# Patient Record
Sex: MALE | Race: BLACK OR AFRICAN AMERICAN | NOT HISPANIC OR LATINO | Employment: UNEMPLOYED | URBAN - METROPOLITAN AREA
[De-identification: names, ages, dates, MRNs, and addresses within clinical notes are randomized per-mention and may not be internally consistent; named-entity substitution may affect disease eponyms.]

---

## 2019-10-10 ENCOUNTER — HOSPITAL ENCOUNTER (OUTPATIENT)
Facility: HOSPITAL | Age: 49
Setting detail: OBSERVATION
Discharge: HOME | End: 2019-10-11
Attending: INTERNAL MEDICINE | Admitting: INTERNAL MEDICINE

## 2019-10-10 ENCOUNTER — APPOINTMENT (OUTPATIENT)
Dept: GENERAL RADIOLOGY | Facility: HOSPITAL | Age: 49
End: 2019-10-10

## 2019-10-10 DIAGNOSIS — R07.9 CHEST PAIN, UNSPECIFIED TYPE: Primary | ICD-10-CM

## 2019-10-10 DIAGNOSIS — R07.89 OTHER CHEST PAIN: ICD-10-CM

## 2019-10-10 LAB
ALBUMIN SERPL-MCNC: 3.7 G/DL (ref 3.5–4.8)
ALBUMIN/GLOB SERPL: 1.1 G/DL (ref 1–1.7)
ALP SERPL-CCNC: 87 U/L (ref 32–91)
ALT SERPL W P-5'-P-CCNC: 16 U/L (ref 17–63)
ANION GAP SERPL CALCULATED.3IONS-SCNC: 13.7 MMOL/L (ref 5–15)
AST SERPL-CCNC: 25 U/L (ref 15–41)
BASOPHILS # BLD AUTO: 0.1 10*3/MM3 (ref 0–0.2)
BASOPHILS NFR BLD AUTO: 1 % (ref 0–1.5)
BILIRUB SERPL-MCNC: 0.3 MG/DL (ref 0.3–1.2)
BNP SERPL-MCNC: 26 PG/ML
BUN BLD-MCNC: 10 MG/DL (ref 8–20)
BUN/CREAT SERPL: 9.1 (ref 6.2–20.3)
CALCIUM SPEC-SCNC: 9.2 MG/DL (ref 8.9–10.3)
CHLORIDE SERPL-SCNC: 100 MMOL/L (ref 101–111)
CO2 SERPL-SCNC: 28 MMOL/L (ref 22–32)
CREAT BLD-MCNC: 1.1 MG/DL (ref 0.7–1.2)
D DIMER PPP FEU-MCNC: 0.29 MCGFEU/ML (ref 0.17–0.59)
DEPRECATED RDW RBC AUTO: 49 FL (ref 37–54)
EOSINOPHIL # BLD AUTO: 0 10*3/MM3 (ref 0–0.4)
EOSINOPHIL NFR BLD AUTO: 0.4 % (ref 0.3–6.2)
ERYTHROCYTE [DISTWIDTH] IN BLOOD BY AUTOMATED COUNT: 15.6 % (ref 12.3–15.4)
GFR SERPL CREATININE-BSD FRML MDRD: 87 ML/MIN/1.73
GLOBULIN UR ELPH-MCNC: 3.5 GM/DL (ref 2.5–3.8)
GLUCOSE BLD-MCNC: 83 MG/DL (ref 65–99)
HCT VFR BLD AUTO: 40.9 % (ref 37.5–51)
HGB BLD-MCNC: 13.9 G/DL (ref 13–17.7)
LYMPHOCYTES # BLD AUTO: 2.5 10*3/MM3 (ref 0.7–3.1)
LYMPHOCYTES NFR BLD AUTO: 31.2 % (ref 19.6–45.3)
MCH RBC QN AUTO: 30.3 PG (ref 26.6–33)
MCHC RBC AUTO-ENTMCNC: 33.9 G/DL (ref 31.5–35.7)
MCV RBC AUTO: 89.4 FL (ref 79–97)
MONOCYTES # BLD AUTO: 0.5 10*3/MM3 (ref 0.1–0.9)
MONOCYTES NFR BLD AUTO: 6.8 % (ref 5–12)
NEUTROPHILS # BLD AUTO: 4.9 10*3/MM3 (ref 1.7–7)
NEUTROPHILS NFR BLD AUTO: 60.6 % (ref 42.7–76)
NRBC BLD AUTO-RTO: 0.1 /100 WBC (ref 0–0.2)
PLATELET # BLD AUTO: 380 10*3/MM3 (ref 140–450)
PMV BLD AUTO: 7.2 FL (ref 6–12)
POTASSIUM BLD-SCNC: 3.7 MMOL/L (ref 3.6–5.1)
PROT SERPL-MCNC: 7.2 G/DL (ref 6.1–7.9)
RBC # BLD AUTO: 4.58 10*6/MM3 (ref 4.14–5.8)
SODIUM BLD-SCNC: 138 MMOL/L (ref 136–144)
TROPONIN I SERPL-MCNC: <0.03 NG/ML (ref 0–0.03)
WBC NRBC COR # BLD: 8.1 10*3/MM3 (ref 3.4–10.8)

## 2019-10-10 PROCEDURE — 93005 ELECTROCARDIOGRAM TRACING: CPT

## 2019-10-10 PROCEDURE — 83880 ASSAY OF NATRIURETIC PEPTIDE: CPT | Performed by: PHYSICIAN ASSISTANT

## 2019-10-10 PROCEDURE — 99220 PR INITIAL OBSERVATION CARE/DAY 70 MINUTES: CPT | Performed by: INTERNAL MEDICINE

## 2019-10-10 PROCEDURE — 71045 X-RAY EXAM CHEST 1 VIEW: CPT

## 2019-10-10 PROCEDURE — 80053 COMPREHEN METABOLIC PANEL: CPT | Performed by: PHYSICIAN ASSISTANT

## 2019-10-10 PROCEDURE — 85025 COMPLETE CBC W/AUTO DIFF WBC: CPT | Performed by: PHYSICIAN ASSISTANT

## 2019-10-10 PROCEDURE — 99284 EMERGENCY DEPT VISIT MOD MDM: CPT

## 2019-10-10 PROCEDURE — 93005 ELECTROCARDIOGRAM TRACING: CPT | Performed by: INTERNAL MEDICINE

## 2019-10-10 PROCEDURE — 84484 ASSAY OF TROPONIN QUANT: CPT | Performed by: PHYSICIAN ASSISTANT

## 2019-10-10 PROCEDURE — 85379 FIBRIN DEGRADATION QUANT: CPT | Performed by: PHYSICIAN ASSISTANT

## 2019-10-10 PROCEDURE — G0378 HOSPITAL OBSERVATION PER HR: HCPCS

## 2019-10-10 PROCEDURE — 84484 ASSAY OF TROPONIN QUANT: CPT | Performed by: INTERNAL MEDICINE

## 2019-10-10 RX ORDER — SODIUM CHLORIDE 0.9 % (FLUSH) 0.9 %
10 SYRINGE (ML) INJECTION EVERY 12 HOURS SCHEDULED
Status: DISCONTINUED | OUTPATIENT
Start: 2019-10-10 | End: 2019-10-11 | Stop reason: HOSPADM

## 2019-10-10 RX ORDER — ONDANSETRON 2 MG/ML
4 INJECTION INTRAMUSCULAR; INTRAVENOUS EVERY 6 HOURS PRN
Status: DISCONTINUED | OUTPATIENT
Start: 2019-10-10 | End: 2019-10-11 | Stop reason: HOSPADM

## 2019-10-10 RX ORDER — ASPIRIN 81 MG/1
81 TABLET, CHEWABLE ORAL ONCE
Status: DISCONTINUED | OUTPATIENT
Start: 2019-10-10 | End: 2019-10-11 | Stop reason: HOSPADM

## 2019-10-10 RX ORDER — ACETAMINOPHEN 650 MG/1
650 SUPPOSITORY RECTAL EVERY 4 HOURS PRN
Status: DISCONTINUED | OUTPATIENT
Start: 2019-10-10 | End: 2019-10-11 | Stop reason: HOSPADM

## 2019-10-10 RX ORDER — CHOLECALCIFEROL (VITAMIN D3) 125 MCG
5 CAPSULE ORAL NIGHTLY PRN
Status: DISCONTINUED | OUTPATIENT
Start: 2019-10-10 | End: 2019-10-11 | Stop reason: HOSPADM

## 2019-10-10 RX ORDER — SODIUM CHLORIDE 0.9 % (FLUSH) 0.9 %
10 SYRINGE (ML) INJECTION AS NEEDED
Status: DISCONTINUED | OUTPATIENT
Start: 2019-10-10 | End: 2019-10-11 | Stop reason: HOSPADM

## 2019-10-10 RX ORDER — ACETAMINOPHEN 325 MG/1
650 TABLET ORAL EVERY 4 HOURS PRN
Status: DISCONTINUED | OUTPATIENT
Start: 2019-10-10 | End: 2019-10-11 | Stop reason: HOSPADM

## 2019-10-10 RX ORDER — ACETAMINOPHEN 160 MG/5ML
650 SOLUTION ORAL EVERY 4 HOURS PRN
Status: DISCONTINUED | OUTPATIENT
Start: 2019-10-10 | End: 2019-10-11 | Stop reason: HOSPADM

## 2019-10-10 RX ORDER — NITROGLYCERIN 0.4 MG/1
0.4 TABLET SUBLINGUAL
Status: DISCONTINUED | OUTPATIENT
Start: 2019-10-10 | End: 2019-10-11 | Stop reason: HOSPADM

## 2019-10-10 RX ORDER — ONDANSETRON 4 MG/1
4 TABLET, FILM COATED ORAL EVERY 6 HOURS PRN
Status: DISCONTINUED | OUTPATIENT
Start: 2019-10-10 | End: 2019-10-11 | Stop reason: HOSPADM

## 2019-10-10 RX ADMIN — Medication 10 ML: at 20:24

## 2019-10-10 NOTE — ED NOTES
Cp for last 2 days, st it was worse today. Went to [facility] [place] [facility] and was sent here     Hilda Franklin RN  10/10/19 1074

## 2019-10-10 NOTE — ED NOTES
Iv not flushing from urgent care. Removed and new iv placed     Hilda Franklin RN  10/10/19 2843

## 2019-10-10 NOTE — H&P
"[facility] HOSPITALIST     Provider, No Known    CHIEF COMPLAINT:     Chest pain    HISTORY OF PRESENT ILLNESS:    Patient is a 48 y/o male who presented to the ED complaining of a two day history of chest pain that initially started out as left sided chest pain radiating to his left arm and neck that was sharp and intermittent. This am around 0500 it woke him from sleep and was constant and more severe. He went to urgent care who sent him to the ED. Patient states he had some improvement with aspirin and nitro given in the ambulance. He denies any other exacerbating or relieving factors. Had some associated sob and diaphoresis.       History reviewed. No pertinent past medical history.  History reviewed. No pertinent surgical history.  Family History   Problem Relation Age of Onset   • Hypertension Mother    • Diabetes Father      Social History     Tobacco Use   • Smoking status: Current Every Day Smoker     Packs/day: 0.50     Types: Cigarettes   • Smokeless tobacco: Never Used   Substance Use Topics   • Alcohol use: Yes     Comment: occ   • Drug use: No       (Not in a hospital admission)  Allergies:  Patient has no known allergies.      There is no immunization history on file for this patient.        REVIEW OF SYSTEMS:  Please see the above history of present illness for pertinent positives and negatives.  The remainder of the patient's systems have been reviewed and are negative.     Vital Signs  Temp:  [98.3 °F (36.8 °C)] 98.3 °F (36.8 °C)  Heart Rate:  [81] 81  Resp:  [15-16] 16  BP: (113-114)/(70-74) 113/70    Flowsheet Rows      First Filed Value   Admission Height  180.3 cm (71\") Documented at 10/10/2019 1140   Admission Weight  66.7 kg (147 lb) Documented at 10/10/2019 1140           Physical Exam:    General: NAD, resting in bed  HENT: normocephalic, atraumatic, MMM, pharynx clear without erythema or exudate  Cardiology: S1, S2, no murmurs  Resp: CTA b/l, no r/r/w  GI: soft, nt, nd, " +bs  Skin: warm, dry, intact, no rashes  Extremities: no c/c/e  Neuro: CN II-XII grossly intact, no focal deficits  Psych: appropriate mood and affect        Results Review:    I reviewed the patient's new clinical results.  Lab Results (most recent)     Procedure Component Value Units Date/Time    Troponin [[id number]]  (Normal) Collected:  10/10/19 1432    Specimen:  Blood Updated:  10/10/19 1517     Troponin I <0.030 ng/mL     Narrative:       Troponin I Reference Range:    0.00-0.03  Negative.  Repeat testing in 4-6 hours if clinically indicated.    0.04-0.29  Suspicious for myocardial injury. Serial measurements and clinical  correlation may be necessary to confirm or exclude diagnosis of acute  coronary syndrome.  Repeat testing in 4-6 hours if indicated.     >0.29 Consistent with myocardial injury.  Recommend clinical and laboratory correlation.     Results my be falsely decreased if patient taking Biotin.     BNP [[id number]]  (Normal) Collected:  10/10/19 1159    Specimen:  Blood Updated:  10/10/19 1512     BNP 26.0 pg/mL      Comment: Results may be falsely decreased if patient taking Biotin.       Troponin [[id number]]  (Normal) Collected:  10/10/19 1159    Specimen:  Blood Updated:  10/10/19 1231     Troponin I <0.030 ng/mL     Narrative:       Troponin I Reference Range:    0.00-0.03  Negative.  Repeat testing in 4-6 hours if clinically indicated.    0.04-0.29  Suspicious for myocardial injury. Serial measurements and clinical  correlation may be necessary to confirm or exclude diagnosis of acute  coronary syndrome.  Repeat testing in 4-6 hours if indicated.     >0.29 Consistent with myocardial injury.  Recommend clinical and laboratory correlation.     Results my be falsely decreased if patient taking Biotin.     Comprehensive Metabolic Panel [[id number]]  (Abnormal) Collected:  10/10/19 1159    Specimen:  Blood Updated:  10/10/19 1223     Glucose 83 mg/dL      BUN 10 mg/dL      Creatinine 1.10 mg/dL       Sodium 138 mmol/L      Potassium 3.7 mmol/L      Chloride 100 mmol/L      CO2 28.0 mmol/L      Calcium 9.2 mg/dL      Total Protein 7.2 g/dL      Albumin 3.70 g/dL      ALT (SGPT) 16 U/L      AST (SGOT) 25 U/L      Alkaline Phosphatase 87 U/L      Total Bilirubin 0.3 mg/dL      eGFR  Edgardo Amer 87 mL/min/1.73      Globulin 3.5 gm/dL      A/G Ratio 1.1 g/dL      BUN/Creatinine Ratio 9.1     Anion Gap 13.7 mmol/L     D-dimer, Quantitative [[id number]]  (Normal) Collected:  10/10/19 1159    Specimen:  Blood Updated:  10/10/19 1219     D-Dimer, Quantitative 0.29 MCGFEU/mL     Narrative:       Reference Range  --------------------------------------------------------------------     < 0.50   Negative Predictive Value  0.50-0.59   Indeterminate    >= 0.60   Probable VTE             A very low percentage of patients with DVT may yield D-Dimer results   below the cut-off of 0.50 MCGFEU/mL.  This is known to be more   prevalent in patients with distal DVT.             Results of this test should always be interpreted in conjunction with   the patient's medical history, clinical presentation and other   findings.  Clinical diagnosis should not be based on the result of   INNOVANCE D-Dimer alone.    CBC & Differential [[id number]] Collected:  10/10/19 1159    Specimen:  Blood Updated:  10/10/19 1207    Narrative:       The following orders were created for panel order CBC & Differential.  Procedure                               Abnormality         Status                     ---------                               -----------         ------                     CBC Auto Differential[[id number]]        Abnormal            Final result                 Please view results for these tests on the individual orders.    CBC Auto Differential [[id number]]  (Abnormal) Collected:  10/10/19 1159    Specimen:  Blood Updated:  10/10/19 1207     WBC 8.10 10*3/mm3      RBC 4.58 10*6/mm3      Hemoglobin 13.9 g/dL      Hematocrit 40.9 %      MCV  89.4 fL      MCH 30.3 pg      MCHC 33.9 g/dL      RDW 15.6 %      RDW-SD 49.0 fl      MPV 7.2 fL      Platelets 380 10*3/mm3      Neutrophil % 60.6 %      Lymphocyte % 31.2 %      Monocyte % 6.8 %      Eosinophil % 0.4 %      Basophil % 1.0 %      Neutrophils, Absolute 4.90 10*3/mm3      Lymphocytes, Absolute 2.50 10*3/mm3      Monocytes, Absolute 0.50 10*3/mm3      Eosinophils, Absolute 0.00 10*3/mm3      Basophils, Absolute 0.10 10*3/mm3      nRBC 0.1 /100 WBC           Imaging Results (most recent)     Procedure Component Value Units Date/Time    XR Chest 1 View [[id number]] Collected:  10/10/19 1226     Updated:  10/10/19 1230    Narrative:       DATE OF EXAM:  10/10/2019 12:15 PM     PROCEDURE:  XR CHEST 1 VW-     INDICATIONS:  Chest pain protocol.     COMPARISON:  10/07/2015, unavailable     TECHNIQUE:   Single radiographic AP view of the chest was obtained.     FINDINGS:  Emphysematous changes involve the lung apices. No pneumothorax is seen.  No acute infiltrate. No cardiomediastinal enlargement.        Impression:       No acute cardiopulmonary disease is seen.     Electronically Signed By-Dr. Trenton Suárez MD On:10/10/2019 12:28 PM  This report was finalized on [id number] by Dr. Trenton Suárez MD.        reviewed    ECG/EMG Results (most recent)     Procedure Component Value Units Date/Time    ECG 12 Lead [[id number]] Collected:  10/10/19 1141     Updated:  10/10/19 1144    Narrative:       HEART RATE= 87  bpm  RR Interval= 688  ms  MA Interval= 170  ms  P Horizontal Axis= 0  deg  P Front Axis= 70  deg  QRSD Interval= 90  ms  QT Interval= 362  ms  QRS Axis= 69  deg  T Wave Axis= 51  deg  - ABNORMAL ECG -  Sinus rhythm  Consider left ventricular hypertrophy  ST elevation suggests acute pericarditis  Electronically Signed By:   Date and Time of Study: 2019-10-10 11:41:33        Reviewed personally and appears unchanged from previous EKG.     Assessment/Plan     Chest Pain eval for ACS  - serial  troponin  - telemetry  - aspirin, nitro prn  - lipid panel in am  - stress test in am    DVT Prophylaxis  - none, pt is ambulatory     I discussed the patient's findings and my recommendations with patient.     Rukhsana Ackerman DO  10/10/19  3:51 PM

## 2019-10-10 NOTE — ED NOTES
Pt st four years ago he was dx with an enlarged heart valve.      Hilda Franklin RN  10/10/19 3429

## 2019-10-10 NOTE — ED PROVIDER NOTES
Subjective   History of Present Illness  Patient is a 49-year-old male who presents with left-sided chest pain for the past 2 days.  Patient states at times it does radiate into his left shoulder and jaw.  He describes as an intermittent type pain that is worse with bending better with rest.  Currently rates it a 7/10 severity.  Patient states he did go to urgent care prior to arrival to the ED he was then brought to the ED via EMS he was given nitro and aspirin prior to arrival.  He states his chest pain has improved since.  He states it first started when he was sleeping yesterday associated diaphoresis and shortness of breath.  Denies any nausea, vomiting, recent cough or illness, recent travel, fever states he has been worked up by a cardiologist several years ago he was told that he had an enlarged valve he states an echocardiogram was performed but states he has not followed up with anyone since.  Patient is a current every day smoker, occasional alcohol drinker, occasional marijuana use otherwise no other illicit drug use  Review of Systems   Constitutional: Negative.    Respiratory: Positive for chest tightness and shortness of breath. Negative for apnea, cough, choking, wheezing and stridor.    Cardiovascular: Positive for chest pain. Negative for palpitations and leg swelling.   Gastrointestinal: Negative for abdominal pain, nausea and vomiting.   Genitourinary: Negative for difficulty urinating, flank pain and hematuria.   Skin: Negative.    Neurological: Negative.        History reviewed. No pertinent past medical history.    No Known Allergies    History reviewed. No pertinent surgical history.    Family History   Problem Relation Age of Onset   • Hypertension Mother    • Diabetes Father        Social History     Socioeconomic History   • Marital status:      Spouse name: Not on file   • Number of children: Not on file   • Years of education: Not on file   • Highest education level: Not on file  "  Tobacco Use   • Smoking status: Current Every Day Smoker     Packs/day: 0.50     Types: Cigarettes   • Smokeless tobacco: Never Used   Substance and Sexual Activity   • Alcohol use: Yes     Comment: occ   • Drug use: No   • Sexual activity: Defer           Objective   Physical Exam   Nursing note and vitals reviewed.  The patient is well developed, well nourished, alert, cooperative and in no acute distress.    HEENT exam is normocephalic atraumatic. Pupils are equal round and reactive to light. EOMI. Conjunctiva noninjected, sclera anicteric, lids without ptosis, edema, or erythema.  Mucous membranes are moist.     Neck is supple nontender without lymphadenopathy.  No JVD bruit or stridor noted    Lungs are clear to auscultation bilaterally chest wall expansion equal without retraction    Cardiovascular:  Regular rate and rhythm without murmur there is no peripheral edema, cyanosis or pallor.  Extremities are warm and well perfused.  Capillary refill less than 2 second    Chest is nontender, no subcutaneous emphysema or crepitus felt     Back shows no CVA tenderness.  Neck and back show no spinal tenderness or bony step-off.  There is no asymmetry of spinal muscles , no tenderness, decreased range of motion or muscular spasm.     Neurologic:  Alert and oriented without focal neurological deficits.     Skin shows no rash, petechiae, or purpura.      Procedures           ED Course    /70 (BP Location: Right arm, Patient Position: Lying)   Pulse 81   Temp 98.3 °F (36.8 °C) (Oral)   Resp 16   Ht 180.3 cm (71\")   Wt 66.7 kg (147 lb)   SpO2 99%   BMI 20.50 kg/m²   Medications   sodium chloride 0.9 % flush 10 mL (not administered)     Xr Chest 1 View    Result Date: 10/10/2019  No acute cardiopulmonary disease is seen.  Electronically Signed By-Dr. Trenton Suárez MD On:10/10/2019 12:28 PM This report was finalized on [id number] by Dr. Trenton Suárez MD.    Labs Reviewed   COMPREHENSIVE METABOLIC PANEL - " Abnormal; Notable for the following components:       Result Value    Chloride 100 (*)     ALT (SGPT) 16 (*)     All other components within normal limits   CBC WITH AUTO DIFFERENTIAL - Abnormal; Notable for the following components:    RDW 15.6 (*)     All other components within normal limits   TROPONIN (IN-HOUSE) - Normal    Narrative:     Troponin I Reference Range:    0.00-0.03  Negative.  Repeat testing in 4-6 hours if clinically indicated.    0.04-0.29  Suspicious for myocardial injury. Serial measurements and clinical  correlation may be necessary to confirm or exclude diagnosis of acute  coronary syndrome.  Repeat testing in 4-6 hours if indicated.     >0.29 Consistent with myocardial injury.  Recommend clinical and laboratory correlation.     Results my be falsely decreased if patient taking Biotin.    D-DIMER, QUANTITATIVE - Normal    Narrative:     Reference Range  --------------------------------------------------------------------     < 0.50   Negative Predictive Value  0.50-0.59   Indeterminate    >= 0.60   Probable VTE             A very low percentage of patients with DVT may yield D-Dimer results   below the cut-off of 0.50 MCGFEU/mL.  This is known to be more   prevalent in patients with distal DVT.             Results of this test should always be interpreted in conjunction with   the patient's medical history, clinical presentation and other   findings.  Clinical diagnosis should not be based on the result of   INNOVANCE D-Dimer alone.   TROPONIN (IN-HOUSE)   BNP (IN-HOUSE)   CBC AND DIFFERENTIAL    Narrative:     The following orders were created for panel order CBC & Differential.  Procedure                               Abnormality         Status                     ---------                               -----------         ------                     CBC Auto Differential[[id number]]        Abnormal            Final result                 Please view results for these tests on the individual  orders.        EKG reviewed by myself and  sinus rhythm rate 87 with ST elevation suggestion of pericarditis this was compared to previous EKG from 10/8/2015 which showed no significant change            MDM  Number of Diagnoses or Management Options  Diagnosis management comments: Chart Review:  Comorbidity: Tobacco use, emphysema  ECG: EKG shows sinus rhythm rate 87 compared to previous EKG from 10/8/2015 with no significant change.  Labs: CBC CMP fairly unremarkable.  Troponin less than 0.03.  D-dimer 0.29, BNP pending upon admission.  Imaging: Was interpreted by physician and reviewed by myself:  Xr Chest 1 View  Result Date: 10/10/2019  No acute cardiopulmonary disease is seen.  Electronically Signed By-Dr. Trenton Suárez MD On:10/10/2019 12:28 PM This report was finalized on [id number] by Dr. Trenton Suárez MD.    Disposition/Treatment:  While in the ED IV was placed and labs were obtained.  Patient was given nitro sublingual and aspirin prior to arrival to the ED and states his chest pain had improved since.  Initial lab work was fairly benign finding was within normal limits.  D-dimer was also within normal limits.  BNP pending upon admission thoughts and findings were discussed with patient and family at bedside he voiced understanding admission for cardiac rule out.  Patient remained stable throughout ED stay.  Spoke to Dr. Ackerman who agreed for admission       Amount and/or Complexity of Data Reviewed  Clinical lab tests: reviewed  Tests in the radiology section of CPT®: reviewed  Tests in the medicine section of CPT®: reviewed        Final diagnoses:   Chest pain, unspecified type              Bernadette Panda PA  10/10/19 4584

## 2019-10-11 ENCOUNTER — APPOINTMENT (OUTPATIENT)
Dept: NUCLEAR MEDICINE | Facility: HOSPITAL | Age: 49
End: 2019-10-11

## 2019-10-11 VITALS
WEIGHT: 147 LBS | RESPIRATION RATE: 16 BRPM | BODY MASS INDEX: 20.58 KG/M2 | HEART RATE: 75 BPM | HEIGHT: 71 IN | TEMPERATURE: 98 F | DIASTOLIC BLOOD PRESSURE: 74 MMHG | OXYGEN SATURATION: 99 % | SYSTOLIC BLOOD PRESSURE: 108 MMHG

## 2019-10-11 PROBLEM — R07.89 CHEST PAIN, ATYPICAL: Status: ACTIVE | Noted: 2019-10-11

## 2019-10-11 PROBLEM — R07.9 CHEST PAIN: Status: RESOLVED | Noted: 2019-10-10 | Resolved: 2019-10-11

## 2019-10-11 PROBLEM — R94.31 ABNORMAL ELECTROCARDIOGRAM (ECG) (EKG): Status: ACTIVE | Noted: 2019-10-11

## 2019-10-11 LAB
ANION GAP SERPL CALCULATED.3IONS-SCNC: 12.4 MMOL/L (ref 5–15)
ARTICHOKE IGE QN: 99 MG/DL (ref 0–100)
BUN BLD-MCNC: 10 MG/DL (ref 8–20)
BUN/CREAT SERPL: 9.1 (ref 6.2–20.3)
CALCIUM SPEC-SCNC: 9 MG/DL (ref 8.9–10.3)
CHLORIDE SERPL-SCNC: 102 MMOL/L (ref 101–111)
CHOLEST SERPL-MCNC: 171 MG/DL
CO2 SERPL-SCNC: 28 MMOL/L (ref 22–32)
CREAT BLD-MCNC: 1.1 MG/DL (ref 0.7–1.2)
GFR SERPL CREATININE-BSD FRML MDRD: 87 ML/MIN/1.73
GLUCOSE BLD-MCNC: 86 MG/DL (ref 65–99)
HDLC SERPL QL: 3
HDLC SERPL-MCNC: 57 MG/DL
LDLC/HDLC SERPL: 1.78 {RATIO}
POTASSIUM BLD-SCNC: 4.4 MMOL/L (ref 3.6–5.1)
SODIUM BLD-SCNC: 138 MMOL/L (ref 136–144)
TRIGL SERPL-MCNC: 64 MG/DL
TROPONIN I SERPL-MCNC: <0.03 NG/ML (ref 0–0.03)
TSH SERPL DL<=0.05 MIU/L-ACNC: 0.57 UIU/ML (ref 0.34–5.6)
VLDLC SERPL-MCNC: 12.8 MG/DL

## 2019-10-11 PROCEDURE — G0378 HOSPITAL OBSERVATION PER HR: HCPCS

## 2019-10-11 PROCEDURE — 78452 HT MUSCLE IMAGE SPECT MULT: CPT

## 2019-10-11 PROCEDURE — 80061 LIPID PANEL: CPT | Performed by: INTERNAL MEDICINE

## 2019-10-11 PROCEDURE — 78452 HT MUSCLE IMAGE SPECT MULT: CPT | Performed by: INTERNAL MEDICINE

## 2019-10-11 PROCEDURE — 93017 CV STRESS TEST TRACING ONLY: CPT

## 2019-10-11 PROCEDURE — 93016 CV STRESS TEST SUPVJ ONLY: CPT | Performed by: NURSE PRACTITIONER

## 2019-10-11 PROCEDURE — 99244 OFF/OP CNSLTJ NEW/EST MOD 40: CPT | Performed by: INTERNAL MEDICINE

## 2019-10-11 PROCEDURE — 80048 BASIC METABOLIC PNL TOTAL CA: CPT | Performed by: INTERNAL MEDICINE

## 2019-10-11 PROCEDURE — 84484 ASSAY OF TROPONIN QUANT: CPT | Performed by: INTERNAL MEDICINE

## 2019-10-11 PROCEDURE — 99217 PR OBSERVATION CARE DISCHARGE MANAGEMENT: CPT | Performed by: INTERNAL MEDICINE

## 2019-10-11 PROCEDURE — 0 TECHNETIUM SESTAMIBI: Performed by: INTERNAL MEDICINE

## 2019-10-11 PROCEDURE — 84443 ASSAY THYROID STIM HORMONE: CPT | Performed by: INTERNAL MEDICINE

## 2019-10-11 PROCEDURE — A9500 TC99M SESTAMIBI: HCPCS | Performed by: INTERNAL MEDICINE

## 2019-10-11 PROCEDURE — 93018 CV STRESS TEST I&R ONLY: CPT | Performed by: INTERNAL MEDICINE

## 2019-10-11 RX ADMIN — TECHNETIUM TC 99M SESTAMIBI 1 DOSE: 1 INJECTION INTRAVENOUS at 07:10

## 2019-10-11 RX ADMIN — Medication 10 ML: at 10:27

## 2019-10-11 RX ADMIN — TECHNETIUM TC 99M SESTAMIBI 1 DOSE: 1 INJECTION INTRAVENOUS at 08:50

## 2019-10-11 NOTE — PROGRESS NOTES
"Discharge Planning Assessment   [facility]     Patient Name: Rm Rodriguez  MRN: [id number]  Today's Date: 10/11/2019    Admit Date: 10/10/2019    Discharge Needs Assessment     Row Name 10/11/19 1033       Living Environment    Lives With  significant other    Current Living Arrangements  home/apartment/condo    Primary Care Provided by  self    Provides Primary Care For  no one    Family Caregiver if Needed  significant other    Quality of Family Relationships  involved    Able to Return to Prior Arrangements  yes       Resource/Environmental Concerns    Resource/Environmental Concerns  none    Transportation Concerns  car, none       Transition Planning    Patient/Family Anticipates Transition to  home    Patient/Family Anticipated Services at Transition  none    Transportation Anticipated  car, drives self;family or friend will provide       Discharge Needs Assessment    Readmission Within the Last 30 Days  no previous admission in last 30 days    Concerns to be Addressed  no discharge needs identified;denies needs/concerns at this time    Equipment Currently Used at Home  none    Anticipated Changes Related to Illness  none    Equipment Needed After Discharge  none        Discharge Plan     Row Name 10/11/19 1034       Plan    Plan  Anticipate routine home.     Patient/Family in Agreement with Plan  yes    Plan Comments  Met with patient at bedside, significant other in room asleep during conversation. Patient reports no current or anticipated needs. Patient confirms that he does not have PCP or insurance - unsure if [facility] met with him, reports \"someone talked about some kind of payment plan\". RAJANI Perez notified. Plan is for stress test this morning.             Functional Status     Row Name 10/11/19 1033       Functional Status    Usual Activity Tolerance  good    Current Activity Tolerance  good       Functional Status, IADL    Medications  independent    Meal Preparation  independent    Housekeeping  " independent    Laundry  independent    Shopping  independent       Mental Status    General Appearance WDL  WDL       Mental Status Summary    Recent Changes in Mental Status/Cognitive Functioning  no changes        Autumn Sacnhezh  [phone]

## 2019-10-11 NOTE — CONSULTS
Cardiology attending:  Seen and examined.  Assessment and plan formulated.  Physical exam findings edited as needed.  Patient denies any further chest discomfort.  Heart regular.  Extensive discussion with patient and significant other at bedside.  Discussed findings of stress test with patient.  No evidence of ischemia was noted.  Patient does have a history of valvular heart disease.  We discussed options and he wishes to go home and have echo performed as an outpatient.  Case discussed with admitting service.  Cardiac status appropriate for discharge.            Cardiology Consult Note      Requesting Physician    No att. providers Juan David Ackerman    PATIENT IDENTIFICATION    Name: Rm Rodriguez Age: 49 y.o. Sex: male :  1970 MRN: [id number]    REASON FOR CONSULTATION:  49-year-old -American gentleman with no known history of ischemic heart disease.No significant risk factors      CC:  Chest pain      HISTORY OF PRESENT ILLNESS:   Patient presented  to the ED complaining of a two day history of chest pain that initially started out as left sided chest pain radiating to his left arm and neck that was sharp and intermittent. Sx began yesterday around 0500 and awoke him from sleep.  The discomfort  was constant and more severe yesterday than previous episodes. He went to urgent care who sent him to the ED. Patient states he had some improvement with aspirin and nitro given in the ambulance.   Patient had serial troponins performed which were negative. He underwent treadmill stress that was normal. He did have abnormal EKG but it does appear similar to his previous EKG from 2015.     REVIEW OF SYSTEMS:  Pertinent items are noted in HPI, all other systems reviewed and negative    OBJECTIVE   Pt. Evaluated in cardiac stress lab.  He ambulated on treadmill for 10 min without any CP.    ADMISSION PROBLEMS    Chest pain, atypical    Abnormal electrocardiogram (ECG) (EKG)    Impressions:  Chest pain with  "atypical features.  History of valvular heart disease  Tobacco abuse.    Recommendations  Nuclear stress test negative  Ambulatory 2D echo after discharge  Cardiac status appropriate for discharge when okay with other services.  Follow-up in 4 to 6 weeks.          Vital Signs  Visit Vitals  /74 (BP Location: Right arm, Patient Position: Lying)   Pulse 75   Temp 98 °F (36.7 °C) (Oral)   Resp 16   Ht 180.3 cm (71\")   Wt 66.7 kg (147 lb)   SpO2 99%   BMI 20.50 kg/m²     Oxygen Therapy  SpO2: 99 %  Pulse Oximetry Type: Intermittent  Device (Oxygen Therapy): room air  Flowsheet Rows      First Filed Value   Admission Height  180.3 cm (71\") Documented at 10/10/2019 1140   Admission Weight  66.7 kg (147 lb) Documented at 10/10/2019 1140        Intake & Output (last 3 days)     None        Lines, Drains & Airways    Active LDAs     None                Medical History    History reviewed. No pertinent past medical history.     Surgical History    History reviewed. No pertinent surgical history.     Family History    Family History   Problem Relation Age of Onset   • Hypertension Mother    • Diabetes Father        Social History    Social History     Tobacco Use   • Smoking status: Current Every Day Smoker     Packs/day: 0.50     Types: Cigarettes   • Smokeless tobacco: Never Used   Substance Use Topics   • Alcohol use: Yes     Comment: occ        Allergies    No Known Allergies           /74 (BP Location: Right arm, Patient Position: Lying)   Pulse 75   Temp 98 °F (36.7 °C) (Oral)   Resp 16   Ht 180.3 cm (71\")   Wt 66.7 kg (147 lb)   SpO2 99%   BMI 20.50 kg/m²   Intake/Output last 3 shifts:  No intake/output data recorded.  Intake/Output this shift:  No intake/output data recorded.    PHYSICAL EXAM:    General: Alert, cooperative, no distress, appears stated age  Head:  Normocephalic, atraumatic, mucous membranes moist  Eyes:  Conjunctiva/corneas clear, EOM's intact     Neck:  Supple,  no adenopathy;    "   Lungs: Clear to auscultation bilaterally, no wheezes rhonchi rales are noted  Chest wall: No tenderness  Heart::  Regular rate and rhythm, S1 and S2 normal, 2/6 oh systolic murmur.  No rub or gallop  Abdomen: Soft, non-tender, nondistended bowel sounds active  Extremities: No cyanosis, clubbing, or edema groin soft no hematoma.  Pulses: 2+ and symmetric all extremities  Skin:  No rashes or lesions  Neuro/psych: A&O x3. CN II through XII are grossly intact with appropriate affect      Scheduled Meds:        aspirin 81 mg Oral Once   sodium chloride 10 mL Intravenous Q12H       Continuous Infusions:         PRN Meds:    •  acetaminophen **OR** acetaminophen **OR** acetaminophen  •  melatonin  •  nitroglycerin  •  ondansetron **OR** ondansetron  •  sodium chloride  •  sodium chloride        Results Review:     I reviewed the patient's new clinical results.    CBC    Results from last 7 days   Lab Units 10/10/19  1159   WBC 10*3/mm3 8.10   HEMOGLOBIN g/dL 13.9   PLATELETS 10*3/mm3 380     Cr Clearance Estimated Creatinine Clearance: 78.3 mL/min (by C-G formula based on SCr of 1.1 mg/dL).  Coag     HbA1C No results found for: HGBA1C  Blood Glucose No results found for: POCGLU  Infection     CMP   Results from last 7 days   Lab Units 10/11/19  0658 10/10/19  1159   SODIUM mmol/L 138 138   POTASSIUM mmol/L 4.4 3.7   CHLORIDE mmol/L 102 100*   CO2 mmol/L 28.0 28.0   BUN mg/dL 10 10   CREATININE mg/dL 1.10 1.10   GLUCOSE mg/dL 86 83   ALBUMIN g/dL  --  3.70   BILIRUBIN mg/dL  --  0.3   ALK PHOS U/L  --  87   AST (SGOT) U/L  --  25   ALT (SGPT) U/L  --  16*     ABG      UA      JOSEFINA  No results found for: POCMETH, POCAMPHET, POCBARBITUR, POCBENZO, POCCOCAINE, POCOPIATES, POCOXYCODO, POCPHENCYC, POCPROPOXY, POCTHC, POCTRICYC  Lysis Labs   Results from last 7 days   Lab Units 10/11/19  0658 10/10/19  1159   HEMOGLOBIN g/dL  --  13.9   PLATELETS 10*3/mm3  --  380   CREATININE mg/dL 1.10 1.10     Radiology(recent) Xr Chest 1  View    Result Date: 10/10/2019  No acute cardiopulmonary disease is seen.  Electronically Signed By-Dr. Trenton Suárez MD On:10/10/2019 12:28 PM This report was finalized on [id number] by Dr. Trenton Suárez MD.        Results from last 7 days   Lab Units 10/11/19  0658   TROPONIN I ng/mL <0.030       Xrays, labs reviewed personally by physician.    ECG/EMG Results (most recent)     Procedure Component Value Units Date/Time    ECG 12 Lead [[id number]] Collected:  10/10/19 1141     Updated:  10/11/19 1450    Narrative:       HEART RATE= 87  bpm  RR Interval= 688  ms  CA Interval= 170  ms  P Horizontal Axis= 0  deg  P Front Axis= 70  deg  QRSD Interval= 90  ms  QT Interval= 362  ms  QRS Axis= 69  deg  T Wave Axis= 51  deg  - ABNORMAL ECG -  Sinus rhythm  Consider left ventricular hypertrophy  ST elevation suggests acute pericarditis  When compared with ECG of 08-Oct-2015 3:42:46,  Significant rate increase  Electronically Signed By: Gabriel Cotto ([facility]) 11-Oct-2019 14:50:48  Date and Time of Study: 2019-10-10 11:41:33            Medication Review:   I have reviewed the patient's current medication list  Scheduled Meds:    aspirin 81 mg Oral Once   sodium chloride 10 mL Intravenous Q12H     Continuous Infusions:   PRN Meds:.•  acetaminophen **OR** acetaminophen **OR** acetaminophen  •  melatonin  •  nitroglycerin  •  ondansetron **OR** ondansetron  •  sodium chloride  •  sodium chloride    Imaging:  Imaging Results (last 72 hours)     Procedure Component Value Units Date/Time    XR Chest 1 View [[id number]] Collected:  10/10/19 1226     Updated:  10/10/19 1230    Narrative:       DATE OF EXAM:  10/10/2019 12:15 PM     PROCEDURE:  XR CHEST 1 VW-     INDICATIONS:  Chest pain protocol.     COMPARISON:  10/07/2015, unavailable     TECHNIQUE:   Single radiographic AP view of the chest was obtained.     FINDINGS:  Emphysematous changes involve the lung apices. No pneumothorax is seen.  No acute infiltrate. No  cardiomediastinal enlargement.        Impression:       No acute cardiopulmonary disease is seen.     Electronically Signed By-Dr. Trenton Suárez MD On:10/10/2019 12:28 PM  This report was finalized on [id number] by Dr. Trenton Suárez MD.            Vandana Allen, APRN  10/11/19  3:36 PM

## 2019-10-11 NOTE — DISCHARGE SUMMARY
Date of Admission: 10/10/2019    Date of Discharge:  10/11/2019    Length of stay:  LOS: 0 days     Admission Diagnosis:   Chest pain     Discharge Diagnosis:   Chest Pain ,? Musculoskeletal in nature  - serial troponin negative  - telemetry unremarkable   - lipid panel wnl  - stress test negative for reversible ischemia    Abnormal EKG  - similar to previous  - discussed with cardiology, pt needs to leave so will have outpatient echocardiogram     Hospital Course:  Patient is a 49 y.o. male presented  to the ED complaining of a two day history of chest pain that initially started out as left sided chest pain radiating to his left arm and neck that was sharp and intermittent. This am around 0500 it woke him from sleep and was constant and more severe. He went to urgent care who sent him to the ED. Patient states he had some improvement with aspirin and nitro given in the ambulance.   Patient had serial troponin performed which was negative. He underwent treadmill stress that was normal. He did have abnormal EKG but it does appear similar to his previous EKG from 2015. He will need echocardiogram and cardiology follow up. Patient reports he needs to leave the hospital to  his check so he will be discharged in stable condition and is to follow up with Dr. Saul.     Procedures Performed:         Consults:   Consults     Date and Time Order Name Status Description    10/10/2019 1420 Hospitalist (on-call MD unless specified) Completed           Vital Signs:  Temp:  [97.7 °F (36.5 °C)-98.3 °F (36.8 °C)] 98 °F (36.7 °C)  Heart Rate:  [71-86] 75  Resp:  [13-17] 16  BP: (104-120)/(70-76) 108/74      Physical Exam:  Physical Exam   Constitutional: He appears well-developed and well-nourished.   Cardiovascular: Normal rate and regular rhythm.   Pulmonary/Chest: Effort normal and breath sounds normal.   Abdominal: Soft. Bowel sounds are normal. He exhibits no distension. There is no tenderness.              Pertinent Test Results:   Lab Results (last 72 hours)     Procedure Component Value Units Date/Time    Basic Metabolic Panel [[id number]]  (Normal) Collected:  10/11/19 0658    Specimen:  Blood Updated:  10/11/19 0835     Glucose 86 mg/dL      BUN 10 mg/dL      Creatinine 1.10 mg/dL      Sodium 138 mmol/L      Potassium 4.4 mmol/L      Chloride 102 mmol/L      CO2 28.0 mmol/L      Calcium 9.0 mg/dL      eGFR   Amer 87 mL/min/1.73      BUN/Creatinine Ratio 9.1     Anion Gap 12.4 mmol/L     Lipid Panel [[id number]] Collected:  10/11/19 0658    Specimen:  Blood Updated:  10/11/19 0835     Total Cholesterol 171 mg/dL      Triglycerides 64 mg/dL      HDL Cholesterol 57 mg/dL      LDL Cholesterol  99 mg/dL      VLDL Cholesterol 12.8 mg/dL      LDL/HDL Ratio 1.78     Chol/HDL Ratio 3.00    Narrative:       The following guidelines have been recommended by the NCEP for Total Cholesterol, Total Triglycerides, LDL Cholesterol, and HDL Cholesterols    Total Cholesterol  Desirable:        <200 mg/dL  Borderline High:  200-239 mg/dL  High:             > or = 240 mg/dL    Total Triglyceride  Normal:           <150 mg/dL  Borderline High:  150-199 mg/dL  High:             200-499 mg/dL  Very High:        > or = 500 mg/dL    HDL Cholesterol  Low HDL:          <40 mg/dL  Normal:           40-60 mg/dL  Desirable:        >60 mg/dL    LDL Cholesterol  Optimal:          <100 mg/dL  Low Risk:         100-129 mg/dL  Borderline High:  130-159 mg/dL  High:             160-189 mg/dL  Very High:        > or = 190 mg/dL    The following ratios of LDL to HDL and Total cholesterol to HDL are for information only:    LDL/HDL Ratio  Desirable:        <5  Optimal:          < or = 3.5    Total Cholesterol/HDL Ratio  Low Risk:         3.3-4.4  Average Risk:     4.4-7.1  Medium Risk:      7.1-11  High Risk:        >11       TSH [[id number]]  (Normal) Collected:  10/11/19 0658    Specimen:  Blood Updated:  10/11/19 0835     TSH 0.570  uIU/mL      Comment: Results may be falsely decreased if patient taking Biotin.       Troponin [[id number]]  (Normal) Collected:  10/11/19 0658    Specimen:  Blood Updated:  10/11/19 0822     Troponin I <0.030 ng/mL     Narrative:       Troponin I Reference Range:    0.00-0.03  Negative.  Repeat testing in 4-6 hours if clinically indicated.    0.04-0.29  Suspicious for myocardial injury. Serial measurements and clinical  correlation may be necessary to confirm or exclude diagnosis of acute  coronary syndrome.  Repeat testing in 4-6 hours if indicated.     >0.29 Consistent with myocardial injury.  Recommend clinical and laboratory correlation.     Results my be falsely decreased if patient taking Biotin.     Troponin [[id number]]  (Normal) Collected:  10/10/19 2128    Specimen:  Blood Updated:  10/10/19 2227     Troponin I <0.030 ng/mL     Narrative:       Troponin I Reference Range:    0.00-0.03  Negative.  Repeat testing in 4-6 hours if clinically indicated.    0.04-0.29  Suspicious for myocardial injury. Serial measurements and clinical  correlation may be necessary to confirm or exclude diagnosis of acute  coronary syndrome.  Repeat testing in 4-6 hours if indicated.     >0.29 Consistent with myocardial injury.  Recommend clinical and laboratory correlation.     Results my be falsely decreased if patient taking Biotin.     Troponin [[id number]]  (Normal) Collected:  10/10/19 1432    Specimen:  Blood Updated:  10/10/19 1517     Troponin I <0.030 ng/mL     Narrative:       Troponin I Reference Range:    0.00-0.03  Negative.  Repeat testing in 4-6 hours if clinically indicated.    0.04-0.29  Suspicious for myocardial injury. Serial measurements and clinical  correlation may be necessary to confirm or exclude diagnosis of acute  coronary syndrome.  Repeat testing in 4-6 hours if indicated.     >0.29 Consistent with myocardial injury.  Recommend clinical and laboratory correlation.     Results my be falsely decreased if  patient taking Biotin.     BNP [[id number]]  (Normal) Collected:  10/10/19 1159    Specimen:  Blood Updated:  10/10/19 1512     BNP 26.0 pg/mL      Comment: Results may be falsely decreased if patient taking Biotin.       Troponin [[id number]]  (Normal) Collected:  10/10/19 1159    Specimen:  Blood Updated:  10/10/19 1231     Troponin I <0.030 ng/mL     Narrative:       Troponin I Reference Range:    0.00-0.03  Negative.  Repeat testing in 4-6 hours if clinically indicated.    0.04-0.29  Suspicious for myocardial injury. Serial measurements and clinical  correlation may be necessary to confirm or exclude diagnosis of acute  coronary syndrome.  Repeat testing in 4-6 hours if indicated.     >0.29 Consistent with myocardial injury.  Recommend clinical and laboratory correlation.     Results my be falsely decreased if patient taking Biotin.     Comprehensive Metabolic Panel [[id number]]  (Abnormal) Collected:  10/10/19 1159    Specimen:  Blood Updated:  10/10/19 1223     Glucose 83 mg/dL      BUN 10 mg/dL      Creatinine 1.10 mg/dL      Sodium 138 mmol/L      Potassium 3.7 mmol/L      Chloride 100 mmol/L      CO2 28.0 mmol/L      Calcium 9.2 mg/dL      Total Protein 7.2 g/dL      Albumin 3.70 g/dL      ALT (SGPT) 16 U/L      AST (SGOT) 25 U/L      Alkaline Phosphatase 87 U/L      Total Bilirubin 0.3 mg/dL      eGFR   Amer 87 mL/min/1.73      Globulin 3.5 gm/dL      A/G Ratio 1.1 g/dL      BUN/Creatinine Ratio 9.1     Anion Gap 13.7 mmol/L     D-dimer, Quantitative [[id number]]  (Normal) Collected:  10/10/19 1159    Specimen:  Blood Updated:  10/10/19 1219     D-Dimer, Quantitative 0.29 MCGFEU/mL     Narrative:       Reference Range  --------------------------------------------------------------------     < 0.50   Negative Predictive Value  0.50-0.59   Indeterminate    >= 0.60   Probable VTE             A very low percentage of patients with DVT may yield D-Dimer results   below the cut-off of 0.50 MCGFEU/mL.  This is  known to be more   prevalent in patients with distal DVT.             Results of this test should always be interpreted in conjunction with   the patient's medical history, clinical presentation and other   findings.  Clinical diagnosis should not be based on the result of   [place] D-Dimer alone.    CBC & Differential [[id number]] Collected:  10/10/19 1159    Specimen:  Blood Updated:  10/10/19 1207    Narrative:       The following orders were created for panel order CBC & Differential.  Procedure                               Abnormality         Status                     ---------                               -----------         ------                     CBC Auto Differential[[id number]]        Abnormal            Final result                 Please view results for these tests on the individual orders.    CBC Auto Differential [[id number]]  (Abnormal) Collected:  10/10/19 1159    Specimen:  Blood Updated:  10/10/19 1207     WBC 8.10 10*3/mm3      RBC 4.58 10*6/mm3      Hemoglobin 13.9 g/dL      Hematocrit 40.9 %      MCV 89.4 fL      MCH 30.3 pg      MCHC 33.9 g/dL      RDW 15.6 %      RDW-SD 49.0 fl      MPV 7.2 fL      Platelets 380 10*3/mm3      Neutrophil % 60.6 %      Lymphocyte % 31.2 %      Monocyte % 6.8 %      Eosinophil % 0.4 %      Basophil % 1.0 %      Neutrophils, Absolute 4.90 10*3/mm3      Lymphocytes, Absolute 2.50 10*3/mm3      Monocytes, Absolute 0.50 10*3/mm3      Eosinophils, Absolute 0.00 10*3/mm3      Basophils, Absolute 0.10 10*3/mm3      nRBC 0.1 /100 WBC                    Imaging Results (all)     Procedure Component Value Units Date/Time    XR Chest 1 View [[id number]] Collected:  10/10/19 1226     Updated:  10/10/19 1230    Narrative:       DATE OF EXAM:  10/10/2019 12:15 PM     PROCEDURE:  XR CHEST 1 VW-     INDICATIONS:  Chest pain protocol.     COMPARISON:  10/07/2015, unavailable     TECHNIQUE:   Single radiographic AP view of the chest was obtained.     FINDINGS:  Emphysematous  changes involve the lung apices. No pneumothorax is seen.  No acute infiltrate. No cardiomediastinal enlargement.        Impression:       No acute cardiopulmonary disease is seen.     Electronically Signed By-Dr. Trenton Suárez MD On:10/10/2019 12:28 PM  This report was finalized on [id number] by Dr. Trenton Suárez MD.                Discharge Disposition:  Home or Self Care    Discharge Medications:     Discharge Medications      Patient Not Prescribed Medications Upon Discharge         Discharge Diet:   Diet Instructions     Diet: Regular      Discharge Diet:  Regular          Activity at Discharge:   Activity Instructions     Activity as Tolerated            Follow-up Appointments:  No future appointments.      Test Results Pending at Discharge:  none    Condition on Discharge:    Stable      Risk for Readmission (LACE): Score: 1 (10/11/2019  6:00 AM)          Rukhsana Ackerman DO  10/11/19  2:26 PM

## 2019-10-11 NOTE — PLAN OF CARE
Problem: Patient Care Overview  Goal: Plan of Care Review  Outcome: Ongoing (interventions implemented as appropriate)   10/11/19 0131   Coping/Psychosocial   Plan of Care Reviewed With patient   Plan of Care Review   Progress no change   OTHER   Outcome Summary Patient kept NPO for stress test in the AM. No complaints of pain or discomfort. Will continue to monitor      Goal: Individualization and Mutuality  Outcome: Ongoing (interventions implemented as appropriate)    Goal: Discharge Needs Assessment  Outcome: Ongoing (interventions implemented as appropriate)    Goal: Interprofessional Rounds/Family Conf  Outcome: Ongoing (interventions implemented as appropriate)      Problem: Pain, Chronic (Adult)  Goal: Identify Related Risk Factors and Signs and Symptoms  Outcome: Ongoing (interventions implemented as appropriate)    Goal: Acceptable Pain/Comfort Level and Functional Ability  Outcome: Ongoing (interventions implemented as appropriate)

## 2019-10-12 LAB
BH CV NUCLEAR PRIOR STUDY: 3
BH CV STRESS BP STAGE 2: NORMAL
BH CV STRESS BP STAGE 3: NORMAL
BH CV STRESS DURATION MIN STAGE 1: 3
BH CV STRESS DURATION MIN STAGE 2: 3
BH CV STRESS DURATION MIN STAGE 3: 3
BH CV STRESS DURATION MIN STAGE 4: 3
BH CV STRESS DURATION SEC STAGE 1: 0
BH CV STRESS DURATION SEC STAGE 2: 0
BH CV STRESS DURATION SEC STAGE 3: 0
BH CV STRESS DURATION SEC STAGE 4: 0
BH CV STRESS GRADE STAGE 1: 10
BH CV STRESS GRADE STAGE 2: 12
BH CV STRESS GRADE STAGE 3: 14
BH CV STRESS GRADE STAGE 4: 16
BH CV STRESS HR STAGE 1: 96
BH CV STRESS HR STAGE 2: 114
BH CV STRESS HR STAGE 3: 135
BH CV STRESS HR STAGE 4: 147
BH CV STRESS METS STAGE 1: 5
BH CV STRESS METS STAGE 2: 7.5
BH CV STRESS METS STAGE 3: 10
BH CV STRESS METS STAGE 4: 13.5
BH CV STRESS PROTOCOL 1: NORMAL
BH CV STRESS RECOVERY BP: NORMAL MMHG
BH CV STRESS RECOVERY HR: 92 BPM
BH CV STRESS SPEED STAGE 1: 1.7
BH CV STRESS SPEED STAGE 2: 2.5
BH CV STRESS SPEED STAGE 3: 3.4
BH CV STRESS SPEED STAGE 4: 4.2
BH CV STRESS STAGE 1: 1
BH CV STRESS STAGE 2: 2
BH CV STRESS STAGE 3: 3
BH CV STRESS STAGE 4: 4
LV EF NUC BP: 56 %
MAXIMAL PREDICTED HEART RATE: 173 BPM
PERCENT MAX PREDICTED HR: 84.97 %
STRESS BASELINE BP: NORMAL MMHG
STRESS BASELINE HR: 86 BPM
STRESS PERCENT HR: 100 %
STRESS POST EXERCISE DUR MIN: 11 MIN
STRESS POST EXERCISE DUR SEC: 13 SEC
STRESS POST PEAK BP: NORMAL MMHG
STRESS POST PEAK HR: 147 BPM
STRESS TARGET HR: 147 BPM

## 2019-10-14 NOTE — PROGRESS NOTES
Case Management Discharge Note    Final Note: Home.              Final Discharge Disposition Code: 01 - home or self-care

## 2019-12-02 ENCOUNTER — HOSPITAL ENCOUNTER (EMERGENCY)
Facility: HOSPITAL | Age: 49
Discharge: HOME | End: 2019-12-02
Attending: EMERGENCY MEDICINE | Admitting: EMERGENCY MEDICINE

## 2019-12-02 ENCOUNTER — APPOINTMENT (OUTPATIENT)
Dept: CT IMAGING | Facility: HOSPITAL | Age: 49
End: 2019-12-02

## 2019-12-02 VITALS
HEART RATE: 82 BPM | DIASTOLIC BLOOD PRESSURE: 60 MMHG | BODY MASS INDEX: 19.41 KG/M2 | SYSTOLIC BLOOD PRESSURE: 104 MMHG | OXYGEN SATURATION: 99 % | TEMPERATURE: 98.3 F | WEIGHT: 138.67 LBS | HEIGHT: 71 IN | RESPIRATION RATE: 18 BRPM

## 2019-12-02 DIAGNOSIS — R31.9 URINARY TRACT INFECTION WITH HEMATURIA, SITE UNSPECIFIED: Primary | ICD-10-CM

## 2019-12-02 DIAGNOSIS — N39.0 URINARY TRACT INFECTION WITH HEMATURIA, SITE UNSPECIFIED: Primary | ICD-10-CM

## 2019-12-02 LAB
ALBUMIN SERPL-MCNC: 4.3 G/DL (ref 3.5–5.2)
ALBUMIN/GLOB SERPL: 1.3 G/DL
ALP SERPL-CCNC: 77 U/L (ref 39–117)
ALT SERPL W P-5'-P-CCNC: 13 U/L (ref 1–41)
ANION GAP SERPL CALCULATED.3IONS-SCNC: 11 MMOL/L (ref 5–15)
AST SERPL-CCNC: 20 U/L (ref 1–40)
BACTERIA UR QL AUTO: ABNORMAL /HPF
BASOPHILS # BLD AUTO: 0.1 10*3/MM3 (ref 0–0.2)
BASOPHILS NFR BLD AUTO: 0.7 % (ref 0–1.5)
BILIRUB SERPL-MCNC: 0.2 MG/DL (ref 0.2–1.2)
BILIRUB UR QL STRIP: NEGATIVE
BUN BLD-MCNC: 20 MG/DL (ref 6–20)
BUN/CREAT SERPL: 16.5 (ref 7–25)
CALCIUM SPEC-SCNC: 9.6 MG/DL (ref 8.6–10.5)
CHLORIDE SERPL-SCNC: 101 MMOL/L (ref 98–107)
CLARITY UR: ABNORMAL
CO2 SERPL-SCNC: 25 MMOL/L (ref 22–29)
COLOR UR: YELLOW
CREAT BLD-MCNC: 1.21 MG/DL (ref 0.76–1.27)
DEPRECATED RDW RBC AUTO: 48.6 FL (ref 37–54)
EOSINOPHIL # BLD AUTO: 0.1 10*3/MM3 (ref 0–0.4)
EOSINOPHIL NFR BLD AUTO: 0.6 % (ref 0.3–6.2)
ERYTHROCYTE [DISTWIDTH] IN BLOOD BY AUTOMATED COUNT: 15.6 % (ref 12.3–15.4)
GFR SERPL CREATININE-BSD FRML MDRD: 78 ML/MIN/1.73
GLOBULIN UR ELPH-MCNC: 3.4 GM/DL
GLUCOSE BLD-MCNC: 81 MG/DL (ref 65–99)
GLUCOSE UR STRIP-MCNC: NEGATIVE MG/DL
HCT VFR BLD AUTO: 41.4 % (ref 37.5–51)
HGB BLD-MCNC: 14.1 G/DL (ref 13–17.7)
HGB UR QL STRIP.AUTO: ABNORMAL
HYALINE CASTS UR QL AUTO: ABNORMAL /LPF
KETONES UR QL STRIP: NEGATIVE
LEUKOCYTE ESTERASE UR QL STRIP.AUTO: ABNORMAL
LIPASE SERPL-CCNC: 20 U/L (ref 13–60)
LYMPHOCYTES # BLD AUTO: 3 10*3/MM3 (ref 0.7–3.1)
LYMPHOCYTES NFR BLD AUTO: 37.1 % (ref 19.6–45.3)
MCH RBC QN AUTO: 30.1 PG (ref 26.6–33)
MCHC RBC AUTO-ENTMCNC: 33.9 G/DL (ref 31.5–35.7)
MCV RBC AUTO: 88.6 FL (ref 79–97)
MONOCYTES # BLD AUTO: 0.8 10*3/MM3 (ref 0.1–0.9)
MONOCYTES NFR BLD AUTO: 9.6 % (ref 5–12)
NEUTROPHILS # BLD AUTO: 4.2 10*3/MM3 (ref 1.7–7)
NEUTROPHILS NFR BLD AUTO: 52 % (ref 42.7–76)
NITRITE UR QL STRIP: NEGATIVE
NRBC BLD AUTO-RTO: 0.3 /100 WBC (ref 0–0.2)
PH UR STRIP.AUTO: 6 [PH] (ref 5–8)
PLATELET # BLD AUTO: 317 10*3/MM3 (ref 140–450)
PMV BLD AUTO: 7 FL (ref 6–12)
POTASSIUM BLD-SCNC: 3.9 MMOL/L (ref 3.5–5.2)
PROT SERPL-MCNC: 7.7 G/DL (ref 6–8.5)
PROT UR QL STRIP: ABNORMAL
RBC # BLD AUTO: 4.67 10*6/MM3 (ref 4.14–5.8)
RBC # UR: ABNORMAL /HPF
REF LAB TEST METHOD: ABNORMAL
SODIUM BLD-SCNC: 137 MMOL/L (ref 136–145)
SP GR UR STRIP: 1.03 (ref 1–1.03)
SQUAMOUS #/AREA URNS HPF: ABNORMAL /HPF
UROBILINOGEN UR QL STRIP: ABNORMAL
WBC NRBC COR # BLD: 8.1 10*3/MM3 (ref 3.4–10.8)
WBC UR QL AUTO: ABNORMAL /HPF

## 2019-12-02 PROCEDURE — 74177 CT ABD & PELVIS W/CONTRAST: CPT

## 2019-12-02 PROCEDURE — 81001 URINALYSIS AUTO W/SCOPE: CPT | Performed by: PHYSICIAN ASSISTANT

## 2019-12-02 PROCEDURE — 25010000002 KETOROLAC TROMETHAMINE PER 15 MG: Performed by: PHYSICIAN ASSISTANT

## 2019-12-02 PROCEDURE — 83690 ASSAY OF LIPASE: CPT | Performed by: PHYSICIAN ASSISTANT

## 2019-12-02 PROCEDURE — 99283 EMERGENCY DEPT VISIT LOW MDM: CPT

## 2019-12-02 PROCEDURE — 96375 TX/PRO/DX INJ NEW DRUG ADDON: CPT

## 2019-12-02 PROCEDURE — 80053 COMPREHEN METABOLIC PANEL: CPT | Performed by: PHYSICIAN ASSISTANT

## 2019-12-02 PROCEDURE — 96361 HYDRATE IV INFUSION ADD-ON: CPT

## 2019-12-02 PROCEDURE — 96374 THER/PROPH/DIAG INJ IV PUSH: CPT

## 2019-12-02 PROCEDURE — 85025 COMPLETE CBC W/AUTO DIFF WBC: CPT | Performed by: PHYSICIAN ASSISTANT

## 2019-12-02 PROCEDURE — 0 IOPAMIDOL PER 1 ML: Performed by: PHYSICIAN ASSISTANT

## 2019-12-02 PROCEDURE — 87086 URINE CULTURE/COLONY COUNT: CPT | Performed by: PHYSICIAN ASSISTANT

## 2019-12-02 PROCEDURE — 25010000002 CEFTRIAXONE PER 250 MG: Performed by: PHYSICIAN ASSISTANT

## 2019-12-02 RX ORDER — CEFDINIR 300 MG/1
300 CAPSULE ORAL 2 TIMES DAILY
Qty: 14 CAPSULE | Refills: 0 | Status: SHIPPED | OUTPATIENT
Start: 2019-12-02

## 2019-12-02 RX ORDER — SODIUM CHLORIDE 0.9 % (FLUSH) 0.9 %
10 SYRINGE (ML) INJECTION AS NEEDED
Status: DISCONTINUED | OUTPATIENT
Start: 2019-12-02 | End: 2019-12-02 | Stop reason: HOSPADM

## 2019-12-02 RX ORDER — KETOROLAC TROMETHAMINE 30 MG/ML
30 INJECTION, SOLUTION INTRAMUSCULAR; INTRAVENOUS ONCE
Status: COMPLETED | OUTPATIENT
Start: 2019-12-02 | End: 2019-12-02

## 2019-12-02 RX ADMIN — KETOROLAC TROMETHAMINE 30 MG: 30 INJECTION, SOLUTION INTRAMUSCULAR at 07:20

## 2019-12-02 RX ADMIN — CEFTRIAXONE SODIUM 1 G: 10 INJECTION, POWDER, FOR SOLUTION INTRAVENOUS at 09:24

## 2019-12-02 RX ADMIN — IOPAMIDOL 100 ML: 755 INJECTION, SOLUTION INTRAVENOUS at 08:21

## 2019-12-02 RX ADMIN — SODIUM CHLORIDE 1000 ML: 900 INJECTION, SOLUTION INTRAVENOUS at 07:20

## 2019-12-02 NOTE — ED PROVIDER NOTES
Subjective   History of Present Illness  Patient is a 49-year-old male who presents with hematuria intermittently over the past 4 to 5 days.  He reports some associated intermittent lower abdominal pain that he describes as a throbbing type pain currently rates a 6/10 severity.  Denies any radiation of the pain.  Patient states usually occurs after urination.  Denies any dysuria does report increased frequency urination.  Denies any nausea vomiting diarrhea fever.  His last bowel movement was about 2 days ago.  States he tried increasing his water intake with some improvement of his symptoms denies any other alleviating or exacerbating factors.  Review of Systems   Constitutional: Negative.    Respiratory: Negative.    Cardiovascular: Negative.    Gastrointestinal: Positive for abdominal pain. Negative for abdominal distention, blood in stool, diarrhea, nausea and vomiting.   Genitourinary: Positive for frequency and hematuria. Negative for difficulty urinating, dysuria, flank pain, penile pain, penile swelling, scrotal swelling and testicular pain.   Musculoskeletal: Negative for neck pain and neck stiffness.   Skin: Negative.    Neurological: Negative.        History reviewed. No pertinent past medical history.    No Known Allergies    History reviewed. No pertinent surgical history.    Family History   Problem Relation Age of Onset   • Hypertension Mother    • Diabetes Father        Social History     Socioeconomic History   • Marital status:      Spouse name: Not on file   • Number of children: Not on file   • Years of education: Not on file   • Highest education level: Not on file   Tobacco Use   • Smoking status: Current Every Day Smoker     Packs/day: 0.50     Types: Cigarettes   • Smokeless tobacco: Never Used   Substance and Sexual Activity   • Alcohol use: Yes     Comment: occ   • Drug use: No   • Sexual activity: Defer           Objective   Physical Exam   Nursing note and vitals reviewed.  The  "patient is well developed, well nourished, alert, cooperative and in no acute distress.    HEENT exam is normocephalic atraumatic. Pupils are equal round and reactive to light. EOMI.  Mucous membranes are moist.     Lungs are clear to auscultation bilaterally chest wall expansion equal without retraction    Cardiovascular:  Regular rate and rhythm without murmur there is no peripheral edema, cyanosis or pallor.  Extremities are warm and well perfused.      Abdomen is soft, tenderness to palpation in the suprapubic region, nondistended.  No guarding or rebound noted no hepatosplenomegaly noted. Bowel sounds are present.  No hernias or masses are felt.     Back shows no CVA tenderness.  Neck and back show no spinal tenderness or bony step-off.  There is no asymmetry of spinal muscles , no tenderness, decreased range of motion or muscular spasm.     Neurologic:  Alert and oriented without focal neurological deficits.     Skin shows no rash, petechiae, or purpura.      Procedures           ED Course    /82 (BP Location: Left arm, Patient Position: Sitting)   Pulse 98   Temp 98.5 °F (36.9 °C) (Oral)   Resp 18   Ht 180.3 cm (71\")   Wt 62.9 kg (138 lb 10.7 oz)   SpO2 99%   BMI 19.34 kg/m²   Labs Reviewed   URINALYSIS W/ CULTURE IF INDICATED - Abnormal; Notable for the following components:       Result Value    Appearance, UA Slightly Cloudy (*)     Blood, UA Large (3+) (*)     Protein,  mg/dL (2+) (*)     Leuk Esterase, UA Moderate (2+) (*)     All other components within normal limits   CBC WITH AUTO DIFFERENTIAL - Abnormal; Notable for the following components:    RDW 15.6 (*)     nRBC 0.3 (*)     All other components within normal limits   URINALYSIS, MICROSCOPIC ONLY - Abnormal; Notable for the following components:    RBC, UA Too Numerous to Count (*)     WBC, UA 21-30 (*)     All other components within normal limits   LIPASE - Normal   URINE CULTURE   COMPREHENSIVE METABOLIC PANEL    Narrative:  "    GFR Normal >60  Chronic Kidney Disease <60  Kidney Failure <15   CBC AND DIFFERENTIAL    Narrative:     The following orders were created for panel order CBC & Differential.  Procedure                               Abnormality         Status                     ---------                               -----------         ------                     CBC Auto Differential[[id number]]        Abnormal            Final result                 Please view results for these tests on the individual orders.     Ct Abdomen Pelvis With Contrast    Result Date: 12/2/2019   1. Abnormal CT scan of the abdomen and pelvis 2. Wall thickening of the urinary bladder with marked enhancement of the inner wall of the urinary bladder consistent with infection, inflammation and/or tumor. Urologic consult recommended. 3. No evidence of adenopathy or additional significant finding 4. Lung bases are free of disease 5 no evidence of free air, free fluid or inflammation within the fat planes of the abdomen or pelvis 6 mild anterior listhesis of L4 on L5 with loss of disc height and degenerative change of the facet 7 ER notified by phone of significant urinary bladder abnormality  Electronically Signed By-Anmol Marion Jr. On:12/2/2019 8:33 AM This report was finalized on [id number] by  Anmol Marion Jr., .    Medications   sodium chloride 0.9 % flush 10 mL (not administered)   sodium chloride 0.9 % bolus 1,000 mL (0 mL Intravenous Stopped 12/2/19 0845)   ketorolac (TORADOL) injection 30 mg (30 mg Intravenous Given 12/2/19 0720)   iopamidol (ISOVUE-370) 76 % injection 100 mL (100 mL Intravenous Given 12/2/19 0821)   cefTRIAXone (ROCEPHIN) in SWFI 1 gram/10ml IV PUSH syringe (1 g Intravenous Given 12/2/19 0924)                   MDM  Number of Diagnoses or Management Options  Diagnosis management comments: Chart Review:  Comorbidity: Hypertension  Differentials: UTI, pyelonephritis, kidney stone, intra-abdominal infection, dissection, peritonitis,  peptic ulcer disease, ischemic bowel, bowel obstruction, appendicitis      ;this list is not all inclusive and does not constitute the entirety of considered causes  ECG: Not warranted  Labs: CBC unremarkable WBC 8.1 CMP unremarkable lipase 20 UA showed large blood moderate leukocyte esterase no bacteria 21-30 WBCs  Imaging: Was interpreted by physician and reviewed by myself:  Ct Abdomen Pelvis With Contrast  Result Date: 12/2/2019   1. Abnormal CT scan of the abdomen and pelvis 2. Wall thickening of the urinary bladder with marked enhancement of the inner wall of the urinary bladder consistent with infection, inflammation and/or tumor. Urologic consult recommended. 3. No evidence of adenopathy or additional significant finding 4. Lung bases are free of disease 5 no evidence of free air, free fluid or inflammation within the fat planes of the abdomen or pelvis 6 mild anterior listhesis of L4 on L5 with loss of disc height and degenerative change of the facet 7 ER notified by phone of significant urinary bladder abnormality  Electronically Signed By-Anmol Marion Jr. On:12/2/2019 8:33 AM This report was finalized on [id number] by  Anmol Marion Jr., .    Disposition/Treatment:  While in the ED IV was placed and labs were obtained patient was given Toradol fluids.  Lab work significant for possible UTI was given Rocephin while in the ED CT was showing wall thickening of the urinary bladder with marked enhancement of the inner wall Dr. Marino was consulted states he will see the patient in office he will be given antibiotic for home.  Lab results and findings were discussed with the patient who voiced understanding of discharge instructions along with signs and symptoms requiring return to the ED.  Upon discharged patient was in stable condition with followup for a revaluation.        Amount and/or Complexity of Data Reviewed  Clinical lab tests: reviewed  Tests in the radiology section of CPT®: reviewed        Final  diagnoses:   Urinary tract infection with hematuria, site unspecified              Bernadette Panda PA  12/02/19 2505

## 2019-12-02 NOTE — DISCHARGE INSTRUCTIONS
Take antibiotic as prescribed.  Be sure to take full course.  Consider taking probiotic or eating yogurt daily while on antibiotic and up to 10 days after to replace the good bacteria in your gastrointestinal tract; this can reduce chances of developing a GI infection such as C difficile    Plenty clear fluids over the next 2 to 3 days.    Follow-up with Dr. Marino's office today or tomorrow for further evaluation as listed below    Return to ED for any new or worsening symptoms

## 2019-12-04 LAB — BACTERIA SPEC AEROBE CULT: NORMAL
